# Patient Record
Sex: FEMALE | URBAN - NONMETROPOLITAN AREA
[De-identification: names, ages, dates, MRNs, and addresses within clinical notes are randomized per-mention and may not be internally consistent; named-entity substitution may affect disease eponyms.]

---

## 2017-05-26 ENCOUNTER — OFFICE VISIT (OUTPATIENT)
Dept: URGENT CARE | Facility: PHYSICIAN GROUP | Age: 67
End: 2017-05-26
Payer: MEDICARE

## 2017-05-26 VITALS
WEIGHT: 163 LBS | SYSTOLIC BLOOD PRESSURE: 146 MMHG | BODY MASS INDEX: 27.83 KG/M2 | HEART RATE: 68 BPM | HEIGHT: 64 IN | DIASTOLIC BLOOD PRESSURE: 96 MMHG | OXYGEN SATURATION: 93 % | TEMPERATURE: 98.6 F | RESPIRATION RATE: 16 BRPM

## 2017-05-26 DIAGNOSIS — H00.033 CELLULITIS OF RIGHT EYELID: ICD-10-CM

## 2017-05-26 DIAGNOSIS — H00.011 HORDEOLUM EXTERNUM OF RIGHT UPPER EYELID: ICD-10-CM

## 2017-05-26 PROCEDURE — 1101F PT FALLS ASSESS-DOCD LE1/YR: CPT | Mod: 8P | Performed by: PHYSICIAN ASSISTANT

## 2017-05-26 PROCEDURE — 3017F COLORECTAL CA SCREEN DOC REV: CPT | Mod: 8P | Performed by: PHYSICIAN ASSISTANT

## 2017-05-26 PROCEDURE — 99203 OFFICE O/P NEW LOW 30 MIN: CPT | Performed by: PHYSICIAN ASSISTANT

## 2017-05-26 PROCEDURE — 4004F PT TOBACCO SCREEN RCVD TLK: CPT | Mod: 8P | Performed by: PHYSICIAN ASSISTANT

## 2017-05-26 PROCEDURE — 3014F SCREEN MAMMO DOC REV: CPT | Mod: 8P | Performed by: PHYSICIAN ASSISTANT

## 2017-05-26 PROCEDURE — G8419 CALC BMI OUT NRM PARAM NOF/U: HCPCS | Performed by: PHYSICIAN ASSISTANT

## 2017-05-26 PROCEDURE — 4040F PNEUMOC VAC/ADMIN/RCVD: CPT | Mod: 8P | Performed by: PHYSICIAN ASSISTANT

## 2017-05-26 PROCEDURE — G8432 DEP SCR NOT DOC, RNG: HCPCS | Performed by: PHYSICIAN ASSISTANT

## 2017-05-26 RX ORDER — HYDROCHLOROTHIAZIDE 25 MG/1
25 TABLET ORAL DAILY
COMMUNITY

## 2017-05-26 RX ORDER — SULFAMETHOXAZOLE AND TRIMETHOPRIM 800; 160 MG/1; MG/1
1 TABLET ORAL 2 TIMES DAILY
Qty: 20 TAB | Refills: 0 | Status: SHIPPED | OUTPATIENT
Start: 2017-05-26

## 2017-05-26 NOTE — MR AVS SNAPSHOT
"        Shelley Jonesclair   2017 10:50 AM   Office Visit   MRN: [id number]    Department:  [facility]   Dept Phone:  [phone]    Description:  Female : 1950   Provider:  Alycia Awan PA-C           Reason for Visit     Stye x4days R eye redness, swelling, painful, R ear pain      Allergies as of 2017     No Known Allergies      You were diagnosed with     Hordeolum externum of right upper eyelid   [121685]       Cellulitis of right eyelid   [4761452]         Vital Signs     Blood Pressure Pulse Temperature Respirations Height Weight    146/96 mmHg 68 37 °C (98.6 °F) 16 1.626 m (5' 4\") 73.936 kg (163 lb)    Body Mass Index Oxygen Saturation Breastfeeding? Smoking Status          27.97 kg/m2 93% No Current Every Day Smoker        Basic Information     Date Of Birth Sex Race Ethnicity Preferred Language    1950 Female Unable to Obtain Unknown English      Health Maintenance     Patient has no pending health maintenance at this time      Current Immunizations     No immunizations on file.      Below and/or attached are the medications your provider expects you to take. Review all of your home medications and newly ordered medications with your provider and/or pharmacist. Follow medication instructions as directed by your provider and/or pharmacist. Please keep your medication list with you and share with your provider. Update the information when medications are discontinued, doses are changed, or new medications (including over-the-counter products) are added; and carry medication information at all times in the event of emergency situations     Allergies:  No Known Allergies          Medications  Valid as of: May 26, 2017 - 11:19 AM    Generic Name Brand Name Tablet Size Instructions for use    HydroCHLOROthiazide (Tab) HYDRODIURIL 25 MG Take 25 mg by mouth every day.        Omeprazole   Take  by mouth.        Sulfamethoxazole-Trimethoprim (Tab) BACTRIM -160 MG Take 1 Tab by " mouth 2 times a day.        Venlafaxine HCl   Take  by mouth.        .                 Medicines prescribed today were sent to:     [facility][id number] - [place], [place] - [address]    [address] [place] [address]    Phone: [phone] Fax: [phone]    Open 24 Hours?: No      Medication refill instructions:       If your prescription bottle indicates you have medication refills left, it is not necessary to call your provider’s office. Please contact your pharmacy and they will refill your medication.    If your prescription bottle indicates you do not have any refills left, you may request refills at any time through one of the following ways: The online SocialStay system (except Urgent Care), by calling your provider’s office, or by asking your pharmacy to contact your provider’s office with a refill request. Medication refills are processed only during regular business hours and may not be available until the next business day. Your provider may request additional information or to have a follow-up visit with you prior to refilling your medication.   *Please Note: Medication refills are assigned a new Rx number when refilled electronically. Your pharmacy may indicate that no refills were authorized even though a new prescription for the same medication is available at the pharmacy. Please request the medicine by name with the pharmacy before contacting your provider for a refill.           SocialStay Access Code: [email or web address]  Expires: 6/25/2017 10:48 AM    SocialStay  A secure, online tool to manage your health information     [facility]’s SocialStay® is a secure, online tool that connects you to your personalized health information from the privacy of your home -- day or night - making it very easy for you to manage your healthcare. Once the activation process is completed, you can even access your medical information using the SocialStay gena, which is available for free in the [employer] Gena store or [employer]  Play store.     Rainforest provides the following levels of access (as shown below):   My Chart Features   [facility] Doctor [facility]  Specialists [facility]  Urgent  Care [facility]  Primary Care  Doctor   Email your healthcare team securely and privately 24/7 X X X    Manage appointments: schedule your next appointment; view details of past/upcoming appointments X      Request prescription refills. X      View recent personal medical records, including lab and immunizations X X X X   View health record, including health history, allergies, medications X X X X   Read reports about your outpatient visits, procedures, consult and ER notes X X X X   See your discharge summary, which is a recap of your hospital and/or ER visit that includes your diagnosis, lab results, and care plan. X X       How to register for Rainforest:  1. Go to  [email or web address].  2. Click on the Sign Up Now box, which takes you to the New Member Sign Up page. You will need to provide the following information:  a. Enter your Rainforest Access Code exactly as it appears at the top of this page. (You will not need to use this code after you’ve completed the sign-up process. If you do not sign up before the expiration date, you must request a new code.)   b. Enter your date of birth.   c. Enter your home email address.   d. Click Submit, and follow the next screen’s instructions.  3. Create a Rainforest ID. This will be your Rainforest login ID and cannot be changed, so think of one that is secure and easy to remember.  4. Create a Rainforest password. You can change your password at any time.  5. Enter your Password Reset Question and Answer. This can be used at a later time if you forget your password.   6. Enter your e-mail address. This allows you to receive e-mail notifications when new information is available in Rainforest.  7. Click Sign Up. You can now view your health information.    For assistance activating your Rainforest account, call [phone]        Quit Tobacco Information     Do you want to quit using tobacco?    Quitting tobacco decreases risks of cancer, heart and lung disease, increases life expectancy, improves sense of taste and smell, and increases spending money, among other benefits.    If you are thinking about quitting, we can help.  • [facility] Quit Tobacco Program: 327.985.5956  o Program occurs weekly for four weeks and includes pharmacist consultation on products to support quitting smoking or chewing tobacco. A provider referral is needed for pharmacist consultation.  • Tobacco Users Help Hotline: 0-171-QUITNOW (894-5354) or https://[place].quitlogix.org/  o Free, confidential telephone and online coaching for [place] residents. Sessions are designed on a schedule that is convenient for you. Eligible clients receive free nicotine replacement therapy.  • Nationally: www.smokefree.gov  o Information and professional assistance to support both immediate and long-term needs as you become, and remain, a non-smoker. Smokefree.gov allows you to choose the help that best fits your needs.

## 2017-05-26 NOTE — PROGRESS NOTES
Chief Complaint   Patient presents with   • Stye     x4days R eye redness, swelling, painful, R ear pain       HISTORY OF PRESENT ILLNESS: Patient is a 66 y.o. female who presents today for evaluation of swelling of her right upper eyelid. She states the symptoms started about 4 days ago and reports associated soreness, redness, soft tissue swelling. Patient denies any ocular pain, foreign body sensation, blurry vision, and exudate. She does complain of right ear pain but denies any other symptoms. She has been using mostly ice but did try heat this morning.    There are no active problems to display for this patient.      Allergies:Review of patient's allergies indicates no known allergies.    Current Outpatient Prescriptions Ordered in [facility]   Medication Sig Dispense Refill   • Venlafaxine HCl (EFFEXOR XR PO) Take  by mouth.     • OMEPRAZOLE PO Take  by mouth.     • hydrochlorothiazide (HYDRODIURIL) 25 MG Tab Take 25 mg by mouth every day.     • sulfamethoxazole-trimethoprim (BACTRIM DS) 800-160 MG tablet Take 1 Tab by mouth 2 times a day. 20 Tab 0     No current Epic-ordered facility-administered medications on file.       History reviewed. No pertinent past medical history.    Social History   Substance Use Topics   • Smoking status: Current Every Day Smoker     Types: Cigarettes   • Smokeless tobacco: Never Used   • Alcohol Use: No       No family status information on file.   History reviewed. No pertinent family history.    ROS:   Review of Systems   Constitutional: Negative for fever, chills, weight loss and malaise/fatigue.   HENT: Negative for ear pain, nosebleeds, congestion, sore throat and neck pain.    Eyes: Negative for blurred vision.   Respiratory: Negative for cough, sputum production, shortness of breath and wheezing.    Cardiovascular: Negative for chest pain, palpitations, orthopnea and leg swelling.   Gastrointestinal: Negative for heartburn, nausea, vomiting and abdominal pain.   Genitourinary:  "Negative for dysuria, urgency and frequency.       Exam:  Blood pressure 146/96, pulse 68, temperature 37 °C (98.6 °F), resp. rate 16, height 1.626 m (5' 4\"), weight 73.936 kg (163 lb), SpO2 93 %, not currently breastfeeding.  General: Normal appearing. No distress.  HEENT: Conjunctiva clear, ears normal shape and contour, canals are clear bilaterally, tympanic membranes are benign. Generalized edema and erythema on the right upper lid with a pustule noted on the edge of the upper eyelid, lateral third. Diffuse soreness noted but no overt pain.  Pulmonary: Clear to ausculation and percussion.  Normal effort. No rales, ronchi, or wheezing.   Cardiovascular: Regular rate and rhythm without murmur.   Neurologic: Grossly nonfocal.  Lymph: No cervical lymphadenopathy noted.  Skin: No obvious lesions.  Psych: Normal mood. Alert and oriented x3. Judgment and insight is normal.    Assessment/Plan:  Continue applying heat to the affected area. Patient to fill antibiotics if her symptoms worsen.  1. Hordeolum externum of right upper eyelid  sulfamethoxazole-trimethoprim (BACTRIM DS) 800-160 MG tablet   2. Cellulitis of right eyelid  sulfamethoxazole-trimethoprim (BACTRIM DS) 800-160 MG tablet         "